# Patient Record
Sex: FEMALE | Race: WHITE
[De-identification: names, ages, dates, MRNs, and addresses within clinical notes are randomized per-mention and may not be internally consistent; named-entity substitution may affect disease eponyms.]

---

## 2019-08-27 ENCOUNTER — HOSPITAL ENCOUNTER (EMERGENCY)
Dept: HOSPITAL 94 - ER | Age: 19
LOS: 1 days | Discharge: HOME | End: 2019-08-28
Payer: MEDICAID

## 2019-08-27 VITALS — HEIGHT: 62 IN | BODY MASS INDEX: 22.96 KG/M2 | WEIGHT: 124.78 LBS

## 2019-08-27 DIAGNOSIS — Z79.899: ICD-10-CM

## 2019-08-27 DIAGNOSIS — R07.89: Primary | ICD-10-CM

## 2019-08-27 PROCEDURE — 71046 X-RAY EXAM CHEST 2 VIEWS: CPT

## 2019-08-27 PROCEDURE — 93005 ELECTROCARDIOGRAM TRACING: CPT

## 2019-08-27 PROCEDURE — 96372 THER/PROPH/DIAG INJ SC/IM: CPT

## 2019-08-27 PROCEDURE — 99283 EMERGENCY DEPT VISIT LOW MDM: CPT

## 2019-08-27 NOTE — NUR
ER TECH TO ROOM FOR EKG AS ORDERED . MOTHER OF PATIENT AT BEDSIDE . PATIENT 
CONTENT NO S/S OF RESP DISTRESS NO COMPLAINTS OF CP AT THIS TIME .

## 2019-08-28 VITALS — SYSTOLIC BLOOD PRESSURE: 130 MMHG | DIASTOLIC BLOOD PRESSURE: 92 MMHG
